# Patient Record
Sex: FEMALE | Race: WHITE
[De-identification: names, ages, dates, MRNs, and addresses within clinical notes are randomized per-mention and may not be internally consistent; named-entity substitution may affect disease eponyms.]

---

## 2017-02-17 ENCOUNTER — HOSPITAL ENCOUNTER (OUTPATIENT)
Dept: HOSPITAL 62 - WI | Age: 37
End: 2017-02-17
Attending: SURGERY
Payer: MEDICARE

## 2017-02-17 DIAGNOSIS — N63: Primary | ICD-10-CM

## 2017-02-17 DIAGNOSIS — R92.8: ICD-10-CM

## 2017-02-17 PROCEDURE — 76642 ULTRASOUND BREAST LIMITED: CPT

## 2017-02-17 PROCEDURE — 77066 DX MAMMO INCL CAD BI: CPT

## 2017-02-17 PROCEDURE — G0279 TOMOSYNTHESIS, MAMMO: HCPCS

## 2017-02-17 PROCEDURE — G0204 DX MAMMO INCL CAD BI: HCPCS

## 2017-02-17 PROCEDURE — 77062 BREAST TOMOSYNTHESIS BI: CPT

## 2018-05-31 ENCOUNTER — HOSPITAL ENCOUNTER (OUTPATIENT)
Dept: HOSPITAL 62 - RAD | Age: 38
End: 2018-05-31
Attending: SURGERY
Payer: MEDICARE

## 2018-05-31 DIAGNOSIS — N61.0: ICD-10-CM

## 2018-05-31 DIAGNOSIS — R92.2: ICD-10-CM

## 2018-05-31 DIAGNOSIS — N60.91: ICD-10-CM

## 2018-05-31 DIAGNOSIS — N63.11: Primary | ICD-10-CM

## 2018-05-31 DIAGNOSIS — N60.31: ICD-10-CM

## 2018-05-31 DIAGNOSIS — N60.41: ICD-10-CM

## 2018-05-31 PROCEDURE — 88341 IMHCHEM/IMCYTCHM EA ADD ANTB: CPT

## 2018-05-31 PROCEDURE — 88305 TISSUE EXAM BY PATHOLOGIST: CPT

## 2018-05-31 PROCEDURE — 88342 IMHCHEM/IMCYTCHM 1ST ANTB: CPT

## 2018-05-31 PROCEDURE — 19081 BX BREAST 1ST LESION STRTCTC: CPT

## 2018-05-31 NOTE — OPERATIVE REPORT
Operative Report


DATE OF SURGERY: 05/31/18


PREOPERATIVE DIAGNOSIS: Asymmetric architecturally distorted area right breast


POSTOPERATIVE DIAGNOSIS: Same


OPERATION: 1.  Stereotactically directed incisional myotomy core biopsies right 

breast.  2.  Interpretation of intraoperative mammography.  3.  Placement of 

clip marker


SURGEON: PHAN COREY


ANESTHESIA: Local


TISSUE REMOVED OR ALTERED: Multiple cores right breast


COMPLICATIONS: 





None


ESTIMATED BLOOD LOSS: 50 cc


INTRAOPERATIVE FINDINGS: See below


PROCEDURE: 





Patient was taken from the radiology holding area to the stereotactic room 

where the right breast was placed in compression.  We approached the asymmetric 

area of architectural distortion of the right breast from a cc direction.  The 

right breast was placed in compression, and the target tissue identified.





Surgical plan surgical timeout conducted.





The surface of the right breast was exposed, prepped with Betadine, and the 

skin anesthetized with Nesocane as the patient was a small mammotomy is made 

with 11 blade, mammotome advanced to the appropriate depth based on 

stereotactic mapping.  Pre-and post fire films showed good alignment between 

the mammotome and the target asymmetric tissue.  





 We completed biopsy in a circumferential fashion with the acquisition of 

approximately 10 cores.  We inspected the cores on the back table and elected 

to obtain approximately 6 more to ensure adequate of intended tissue.  We then 

obtained completion mammograms  showing acquisition of intended tissue.  Then 

placed a clip marker in the cavity.  There was some bleeding during the 

procedure.  Patient was placed out of compression, and direct compression 

applied to the biopsy site.  Dressings applied.  Patient tolerated procedure 

well.





Discharge instructions provided to patient's mother.





The patient and mother upon leaving the hospital went to [facility].  

There the patient developed bleeding from the operative site.  She was brought 

back to the radiology suite where she was placed in a procedure room.  There 

was no active bleeding at this time.  Bulky bloody dressings were removed, and 

we proceeded to close the biopsy site incision with a 3 oh figure-of-eight 

Prolene suture.  There was minimal hematoma.  There was no active bleeding.  A 

compression dressing was applied





Discharge instructions were provided to the patient and patient's mother to 

remove present dressings in 24 hours.

## 2018-05-31 NOTE — DISCHARGE SUMMARY
Discharge Summary (SDC)





- Discharge


Final Diagnosis: 





Asymmetric nodular density right breast


Date of Surgery: 05/31/18


Discharge Date: 05/31/18


Condition: Good


Treatment or Instructions: 


Patient to remove compression dressing in 24 hours; return to [facility] in 1-2 weeks; take Motrin or Tylenol as needed pain.  Resume 

preoperative medications diet activity


Referrals: 


PHAN COREY MD [Primary Care Provider] - 


Discharge Diet: As Tolerated


Discharge Activity: Activity As Tolerated


Home Care Assistance: None Needed


Report the Following to Your Physician Immediately: Shortness of Breath, 

Increase in Pain, Fever over 101 Degrees, Unusual Bleeding

## 2018-06-05 NOTE — WOMENS IMAGING REPORT
EXAM DESCRIPTION:  STEREO BREAST BX; RIGHT DIG DX MAMMO NO CHG



COMPLETED DATE/TIME:  5/31/2018 1:05 pm; 5/31/2018 1:07 pm



REASON FOR STUDY:  RIGHT BREAST MASS (N63.11); RIGHT BREAST STEREO BX; N63.11 N63.11  UNSPECIFIED LUM
P IN THE RIGHT BREAST, UPPER OUTER LOW R92.2  INCONCLUSIVE MAMMOGRAM



COMPARISON:  None.



LIMITATIONS:  None.



PROCEDURE:  Vacuum-assisted stereotactic-guided biopsy of the lesion in the right breast performed by
 Dr. Castillo who also targeted the lesion.

Using stereotactic guidance, a vacuum-assisted  core biopsy of the targeted lesion was performed. A h
ourglass clip was deployed at the biopsy site.  Post procedure image reveals the clip at the biopsy s
ite.



TECHNIQUE:  Images from the stereotactic unit acquired during the procedure.

Specimen radiography performed. No.

Post- procedure image acquired post-clip placement. Yes.

Post procedure 2 view mammograms performed in the mammography suite. Yes.



FINDINGS:  SPECIMEN RADIOGRAPH:Not obtained.  The lesion biopsied was a focal mass with no calcificat
ions..

POST PROCEDURE MAMMOGRAMS FOR MARKER PLACEMENT: Yes.

POST PROCEDURE MAMMOGRAM: Clip is in expected location.  No significant hematoma.

PATHOLOGY: Fibrocystic changes.  Usual ductal hyperplasia.  Focal papillomatosis and fibrosis.  No ca
rcinoma identified.

CONCORDANT: Yes. The operating surgeon was notified of the  findings.



IMPRESSION:  SUCCESSFUL STEREOTACTIC-GUIDED BIOPSY OF LESION  IN THE

RIGHT BREAST.

BIOPSY RESULTS ARE CONCORDANT WITH IMAGING  FINDINGS.

FOLLOW-UP: PER SURGEON



TECHNICAL DOCUMENTATION:  JOB ID:  [id number]

 2011 [employer]- All Rights Reserved



Reading location - IP/workstation name: [facility]-RR

## 2018-06-05 NOTE — RADIOLOGY REPORT (SQ)
EXAM DESCRIPTION:  STEREO BREAST BX; RIGHT DIG DX MAMMO NO CHG



COMPLETED DATE/TIME:  5/31/2018 1:05 pm; 5/31/2018 1:07 pm



REASON FOR STUDY:  RIGHT BREAST MASS (N63.11); RIGHT BREAST STEREO BX; N63.11 N63.11  UNSPECIFIED LUM
P IN THE RIGHT BREAST, UPPER OUTER LOW R92.2  INCONCLUSIVE MAMMOGRAM



COMPARISON:  None.



LIMITATIONS:  None.



PROCEDURE:  Vacuum-assisted stereotactic-guided biopsy of the lesion in the right breast performed by
 Dr. Castillo who also targeted the lesion.

Using stereotactic guidance, a vacuum-assisted  core biopsy of the targeted lesion was performed. A h
ourglass clip was deployed at the biopsy site.  Post procedure image reveals the clip at the biopsy s
ite.



TECHNIQUE:  Images from the stereotactic unit acquired during the procedure.

Specimen radiography performed. No.

Post- procedure image acquired post-clip placement. Yes.

Post procedure 2 view mammograms performed in the mammography suite. Yes.



FINDINGS:  SPECIMEN RADIOGRAPH:Not obtained.  The lesion biopsied was a focal mass with no calcificat
ions..

POST PROCEDURE MAMMOGRAMS FOR MARKER PLACEMENT: Yes.

POST PROCEDURE MAMMOGRAM: Clip is in expected location.  No significant hematoma.

PATHOLOGY: Fibrocystic changes.  Usual ductal hyperplasia.  Focal papillomatosis and fibrosis.  No ca
rcinoma identified.

CONCORDANT: Yes. The operating surgeon was notified of the  findings.



IMPRESSION:  SUCCESSFUL STEREOTACTIC-GUIDED BIOPSY OF LESION  IN THE

RIGHT BREAST.

BIOPSY RESULTS ARE CONCORDANT WITH IMAGING  FINDINGS.

FOLLOW-UP: PER SURGEON



TECHNICAL DOCUMENTATION:  JOB ID:  [id number]

 2011 [employer]- All Rights Reserved



Reading location - IP/workstation name: [facility]-RR

## 2018-10-31 ENCOUNTER — HOSPITAL ENCOUNTER (OUTPATIENT)
Dept: HOSPITAL 62 - RAD | Age: 38
End: 2018-10-31
Attending: SURGERY
Payer: MEDICARE

## 2018-10-31 DIAGNOSIS — N63.12: Primary | ICD-10-CM

## 2018-10-31 PROCEDURE — 77065 DX MAMMO INCL CAD UNI: CPT

## 2018-10-31 NOTE — WOMENS IMAGING REPORT
EXAM DESCRIPTION:  3D DX MAMMO RIGHT UNILAT



COMPLETED DATE/TIME:  10/31/2018 10:59 am



REASON FOR STUDY:  RIGHT BREAST MASS N63.12  UNSPECIFIED LUMP IN THE RIGHT BREAST, UPPER INNER LOW



COMPARISON:  5/31/2018, 2/17/2017, and 1/26/2017.



TECHNIQUE:  Standard craniocaudal and mediolateral oblique images of the breast recorded using digita
l acquisition and breast tomosynthesis.

Additional true lateral images acquired with tomosynthesis.



LIMITATIONS:  None.



FINDINGS:  BREAST: right

MASSES: No suspicious masses.

CALCIFICATIONS: No new or suspicious calcifications.

ARCHITECTURAL DISTORTION: Focal area of architectural distortion in the superior breast associated wi
th the biopsy clip.  On the CC image this has a somewhat nodular appearance but on lateral and MLO im
ages a discrete mass is not visualized.

DEVELOPING DENSITY: None.

ASYMMETRY: None noted.

OTHER: No other significant findings.

Read with the assistance of CAD.

[facility] [employer] Version 1.3

.[facility] - R2 [employer] Version 1.3

.[facility] - R2 Cenova Version 2.4

.[facility] - R2 Cenova Version 2.4

.[facility] - R2  Version 9.2



IMPRESSION:  Biopsy changes in the superior breast with focal architectural distortion associated wit
h the biopsy clip.



BREAST DENSITY:  b. There are scattered areas of fibroglandular density.



BIRAD:  2 Benign findings.



RECOMMENDATION:  RECOMMENDED FOLLOW UP: Follow-up per the patient's surgeon.  May resume annual scree
jena mammography in 6 months.

SPECIFIC INTERVENTION/IMAGING/CONSULTATION RECOMMENDED:No additional intervention/ imaging/consultati
on needed at this time.

COMMUNICATION:The imaging findings were not discussed with the patient. Her referring provider has be
en notified of the findings.



COMMENT:  The patient has been notified of the results by letter per SA requirements. Additional no
tification policies are in place for contacting patient with suspicious or incomplete findings.

Quality ID #225: The American College of Radiology recommends an annual screening mammogram for women
 aged 40 years or over. This facility utilizes a reminder system to ensure that all patients receive 
reminder letters, and/or direct phone calls for appointments. This includes reminders for routine scr
eening mammograms, diagnostic mammograms, or other Breast Imaging Interventions when appropriate.  Th
is patient will be placed in the appropriate reminder system.

The American College of Radiology (ACR) has developed recommendations for screening MRI of the breast
s in certain patient populations, to be used in conjunction with mammography.  Breast MRI surveillanc
e may be appropriate for women with more than 20% lifetime risk of developing breast cancer  as deter
mined by genetic testing, significant family history of the disease, or history of mantle radiation f
or Hodgkins Disease.  ACR Practice Guidelines 2008.

[employer]



PQRS 6045F: Fluoroscopic imaging is not utilized for breast tomosynthesis.



TECHNICAL DOCUMENTATION:  FINDING NUMBER: (1)

ASSESSMENT: (1)

JOB ID:  [id number]

 2011 [employer]- All Rights Reserved



Reading location - IP/workstation name: [email or web address]